# Patient Record
Sex: MALE | Race: WHITE
[De-identification: names, ages, dates, MRNs, and addresses within clinical notes are randomized per-mention and may not be internally consistent; named-entity substitution may affect disease eponyms.]

---

## 2019-11-06 PROBLEM — Z00.00 ENCOUNTER FOR PREVENTIVE HEALTH EXAMINATION: Status: ACTIVE | Noted: 2019-11-06

## 2019-11-20 ENCOUNTER — APPOINTMENT (OUTPATIENT)
Dept: SPEECH THERAPY | Facility: CLINIC | Age: 54
End: 2019-11-20

## 2019-11-20 ENCOUNTER — OUTPATIENT (OUTPATIENT)
Dept: OUTPATIENT SERVICES | Facility: HOSPITAL | Age: 54
LOS: 1 days | Discharge: HOME | End: 2019-11-20

## 2019-11-21 ENCOUNTER — APPOINTMENT (OUTPATIENT)
Dept: SPEECH THERAPY | Facility: CLINIC | Age: 54
End: 2019-11-21

## 2019-11-22 DIAGNOSIS — R42 DIZZINESS AND GIDDINESS: ICD-10-CM

## 2019-11-22 DIAGNOSIS — H90.42 SENSORINEURAL HEARING LOSS, UNILATERAL, LEFT EAR, WITH UNRESTRICTED HEARING ON THE CONTRALATERAL SIDE: ICD-10-CM

## 2022-11-23 ENCOUNTER — APPOINTMENT (OUTPATIENT)
Dept: VASCULAR SURGERY | Facility: CLINIC | Age: 57
End: 2022-11-23

## 2022-11-23 VITALS
BODY MASS INDEX: 20.72 KG/M2 | SYSTOLIC BLOOD PRESSURE: 90 MMHG | HEIGHT: 67 IN | DIASTOLIC BLOOD PRESSURE: 65 MMHG | WEIGHT: 132 LBS | TEMPERATURE: 97.3 F

## 2022-11-23 DIAGNOSIS — K65.1 PERITONEAL ABSCESS: ICD-10-CM

## 2022-11-23 DIAGNOSIS — K94.23 GASTROSTOMY MALFUNCTION: ICD-10-CM

## 2022-11-23 PROCEDURE — 99024 POSTOP FOLLOW-UP VISIT: CPT

## 2022-11-23 NOTE — HISTORY OF PRESENT ILLNESS
[de-identified] : 56 yo male with history of cardiac arrest with prolong recovery, who is a nursing home resident, was admitted to [facility] after his PEG tube fell out and replacement was attempted with malpositiioning of the tube, causing intra-abdominal pelvic and gastric wall abscess.  He was managed conservatively with NPO and IV abx and he responded well.  He comes today as follow up with repeat CT scan.\par \par He denies any abdominal pain.  He is tolerating oral diet and is gaining some weight back.  He denies fevers or chills.  \par \par CT scan reviewed.  There is a small residual fluid collection in the wall of the stomach, no surrounding inflammation.  The perigastric inflammation seen on prior imaging has resolved.  No pelvic abscess.

## 2022-11-23 NOTE — ASSESSMENT
[FreeTextEntry1] : Intra-abdominal abscesses have mostly resolved\par Ok to DC abx and removed PICC line\par Ok to advance diet as tolerated.  No need to replace PEG tube as patient appeers to be taking in enough calories by mouth.

## 2022-11-23 NOTE — PHYSICAL EXAM
[Normal Breath Sounds] : Normal breath sounds [Normal Heart Sounds] : normal heart sounds [de-identified] : NAD, minimally verbal [de-identified] : SRINI [de-identified] : soft, NT, ND.\par old PEG site is closed and healing well.